# Patient Record
Sex: MALE | Race: WHITE
[De-identification: names, ages, dates, MRNs, and addresses within clinical notes are randomized per-mention and may not be internally consistent; named-entity substitution may affect disease eponyms.]

---

## 2018-06-21 ENCOUNTER — HOSPITAL ENCOUNTER (EMERGENCY)
Dept: HOSPITAL 89 - ER | Age: 38
Discharge: HOME | End: 2018-06-21
Payer: COMMERCIAL

## 2018-06-21 VITALS — DIASTOLIC BLOOD PRESSURE: 70 MMHG | SYSTOLIC BLOOD PRESSURE: 110 MMHG

## 2018-06-21 DIAGNOSIS — S42.135A: ICD-10-CM

## 2018-06-21 DIAGNOSIS — S46.212A: Primary | ICD-10-CM

## 2018-06-21 PROCEDURE — 73060 X-RAY EXAM OF HUMERUS: CPT

## 2018-06-21 PROCEDURE — 99283 EMERGENCY DEPT VISIT LOW MDM: CPT

## 2018-06-21 PROCEDURE — 73070 X-RAY EXAM OF ELBOW: CPT

## 2018-06-21 NOTE — RADIOLOGY IMAGING REPORT
FACILITY: [facility] 

 

PATIENT NAME: Jon Hunter

: 1980

MR: [id number]

V: [id number]

EXAM DATE: 

ORDERING PHYSICIAN: EDITH KENYON

TECHNOLOGIST: 

 

Location: [facility] - [place]

Patient: Jon Hunter

: 1980

MRN: [id number]

Visit/Account:[id number]

Date of Sevice:  2018

 

ACCESSION #: [id number]

 

Exam type: ELBOW 2 VIEW LEFT

 

History: arm pain

 

Comparison: None.

 

Findings:

 

There is a tiny avulsion fracture fragment just anterior to the left elbow joint.  The donor site vannesa
ears to be the coronoid process.  No radiopaque soft tissue foreign body seen.

 

IMPRESSION:

 

1.  There is a tiny avulsion fracture fragment seen just anterior to the left elbow joint.  The donor
 site appears to be the coronoid process

 

Report Dictated By: Dolores Luna MD at 2018 1:46 PM

 

Report E-Signed By: Dolores Luna MD  at 2018 1:50 PM

 

WSN:[id number]

## 2018-06-21 NOTE — RADIOLOGY IMAGING REPORT
[place] [facility] [place] 

 

PATIENT NAME: Jon Hunter

: 1980

MR: [id number]

V: [id number]

EXAM DATE: 

ORDERING PHYSICIAN: EDITH KENYON

TECHNOLOGIST: 

 

Location: [facility]

Patient: Jon Hunter

: 1980

MRN: [id number]

Visit/Account:[id number]

Date of Sevice:  2018

 

ACCESSION #: [id number]

 

Exam type: HUMERUS LEFT

 

History: arm pain

 

Comparison: Left elbow performed today.

 

Findings:

 

Small avulsion fracture fragment of the coronoid process appears to be better depicted on today's lef
t elbow series.  No fracture of the left humerus is appreciated

 

IMPRESSION:

 

1.  Small motion fracture fragment of the coronoid process appears to be better depicted on today's l
eft elbow series

 

Report Dictated By: Dolores Luna MD at 2018 1:50 PM

 

Report E-Signed By: Dolores Luna MD  at 2018 1:52 PM

 

WSN:[email or web address]

## 2018-10-18 ENCOUNTER — HOSPITAL ENCOUNTER (EMERGENCY)
Dept: HOSPITAL 89 - ER | Age: 38
LOS: 1 days | Discharge: HOME | End: 2018-10-19
Payer: SELF-PAY

## 2018-10-18 VITALS — SYSTOLIC BLOOD PRESSURE: 126 MMHG | DIASTOLIC BLOOD PRESSURE: 92 MMHG

## 2018-10-18 DIAGNOSIS — S92.415A: Primary | ICD-10-CM

## 2018-10-18 PROCEDURE — 99283 EMERGENCY DEPT VISIT LOW MDM: CPT

## 2018-10-18 NOTE — ER REPORT
History and Physical


Time Seen By MD:  23:25


HPI/ROS


CHIEF COMPLAINT: Great toe injury





HISTORY OF PRESENT ILLNESS: 38-year-old male presents ambulatory to the ER 


complaining of severe left toe pain, ecchymosis and swelling.  Patient states he


kicked a wall approximately 8 hours ago.  He did not realize the wall behind the


surface.  He was kicking was concrete.  He has throbbing 8/10 pain in his left 


toe.  His other is a swollen and ecchymotic.  Patient is a type II diabetic, 


insulin required.


Allergies:  


Coded Allergies:  


     Penicillins (Unverified  Allergy, Unknown, none listed by patient, 


10/18/18)


     ibuprofen (Unverified  Allergy, Unknown, not listed by patient, 10/18/18)


     lavender (Lavandula angustifolia) (Unverified  Allergy, Unknown, not listed


by patient, 10/18/18)


Home Meds


Active Scripts


Hydrocodone Bit/Acetaminophen (HYDROCODON-ACETAMINOPHEN 5-325) 1 Each Tablet, 1 


EACH PO Q4-6H PRN for PAIN, #20


   TAKE ONE TABLET BY MOUTH


   EVERY 4-6 HOURS AS NEEDED


   FOR PAIN


   Prov:CHUY WATERS DO         10/19/18


Reported Medications


Insulin Lispro 100 Un/Ml Vial (HUMALOG 100 U/ML VIAL) Unknown Strength Vial, SQ 


PRN, VIAL


   6/15/18


Lisinopril (LISINOPRIL) Unknown Strength Tablet, PO QDAY, TAB


   6/15/18


Cholecalciferol (Vitamin D3) (VITAMIN D3) Unknown Strength Tablet, PO QDAY


   6/15/18


Insulin Glargine (LANTUS) 100 Unit/Ml Soln, 40 UNITS SUBQ QDAY, ML


   6/15/18


Past Medical/Surgical History


Diabetes insulin required


Reviewed Nurses Notes:  Yes


Old Medical Records Reviewed:  Yes


Smoking Status:  Current: Every Day Smoker


Hx Substance Use Disorder:  No


Hx Alcohol Use:  Yes (occ)


Constitutional





Vital Sign - Last 24 Hours








 10/18/18 10/18/18 10/18/18 10/18/18





 23:28 23:28 23:37 23:52


 


Temp  98.8  


 


Pulse  89 90 85


 


Resp  18  


 


B/P (MAP) 126/92 (103) 126/92  


 


Pulse Ox  91 89 96


 


O2 Delivery  Room Air  


 


    





 10/19/18   





 00:07   


 


Pulse 82   


 


Pulse Ox 92   








Physical Exam


   General appearance: Alert no distress.


Respiratory: Chest is non tender, lungs are clear to auscultation.


Cardiac: Regular rate and rhythm 


Extremities: Examination of the left foot reveals an ecchymotic great toe.  It 


is swollen moderately.





DIFFERENTIAL DIAGNOSIS: After history and physical exam differential diagnosis 


was considered for sprain, strain, fracture, dislocation, contusion





Medical Decision Making


EKG/Imaging


Imaging


X-ray: The great toe, 3 views was obtained.  I viewed the images myself on the 


PACS system.  My interpretation of the images is: Nondisplaced fracture of the 


small phalanx.  The radiologist interpretation had no clinically significant 


variation from this interpretation.





ED Course/Re-evaluation


ED Course


She was admitted to an examination room.  H&P was done.  The differential 


diagnoses was considered.  On clinical examination.  Patient has an ecchymotic 


toe consistent with a significant injury.  X-rays are performed.  Shows a 


nondisplaced fracture of the proximal phalanx of the left great toe.  She's 


placed in a postoperative shoe for support.  He is advised ice and ibuprofen.  


Prescription of hydrocodone as provided for additional pain relief.  Given a 


note to be off his feet for 4-5 days.


Decision to Disposition Date:  Oct 19, 2018


Decision to Disposition Time:  00:01





Depart


Departure


Latest Vital Signs





Vital Signs








  Date Time  Temp Pulse Resp B/P (MAP) Pulse Ox O2 Delivery O2 Flow Rate FiO2


 


10/19/18 00:07  82   92   


 


10/18/18 23:28 98.8  18 126/92  Room Air  








Impression:  


   Primary Impression:  


   Fracture of great toe, left, closed


Condition:  Improved


Disposition:  HOME OR SELF-CARE


New Scripts


Hydrocodone Bit/Acetaminophen (HYDROCODON-ACETAMINOPHEN 5-325) 1 Each Tablet


1 EACH PO Q4-6H PRN for PAIN, #20


   TAKE ONE TABLET BY MOUTH


   EVERY 4-6 HOURS AS NEEDED


   FOR PAIN


   Prov: CHUY WATERS DO         10/19/18


Patient Instructions:  Toe Fracture (ED)





Additional Instructions:  


Take ibuprofen 200 mg 3 tablets 3 times a day with food


Elevate your toe and apply ice


Follow-up with your primary care early next week





Problem Qualifiers








   Primary Impression:  


   Fracture of great toe, left, closed


   Encounter type:  initial encounter  Phalanx:  proximal  Fracture alignment:  


   nondisplaced  Qualified Codes:  S92.415A - Nondisplaced fracture of proximal 


   phalanx of left great toe, initial encounter for closed fracture








CHUY WATERS DO              Oct 18, 2018 23:26

## 2018-10-19 NOTE — RADIOLOGY IMAGING REPORT
FACILITY: [facility] - [place] 

 

PATIENT NAME: Jon Hunter

: 1980

MR: [id number]

V: [id number]

EXAM DATE: 

ORDERING PHYSICIAN: CHUY WATERS

TECHNOLOGIST: 

 

Location: [facility] - [place]

Patient: Jon Hunter

: 1980

MRN: [id number]

Visit/Account:[id number]

Date of Sevice: 10/18/2018

 

ACCESSION #: [id number]

 

 

Left first toe:

 

Indication: Injury.

Technique: 3 views were obtained.

Comparison: None.

 

Findings: There is a nondisplaced fracture of the proximal phalanx. There is no evidence of dislocati
on. The visualized skeletal structures are otherwise intact. There is normal mineralization. No focal
 soft tissue deformity is evident.

 

IMPRESSION: Nondisplaced fracture of the proximal phalanx.

 

Report Dictated By: West Forrest MD at 10/19/2018 12:39 AM

 

Report E-Signed By: West Forrest MD  at 10/19/2018 12:41 AM

 

WSN:[id number]

## 2019-02-11 ENCOUNTER — HOSPITAL ENCOUNTER (OUTPATIENT)
Dept: HOSPITAL 89 - DIET | Age: 39
LOS: 9 days | Discharge: HOME | End: 2019-02-20
Attending: FAMILY MEDICINE
Payer: SELF-PAY

## 2019-02-11 DIAGNOSIS — Z79.4: ICD-10-CM

## 2019-02-11 DIAGNOSIS — E10.9: Primary | ICD-10-CM

## 2019-02-14 ENCOUNTER — HOSPITAL ENCOUNTER (EMERGENCY)
Dept: HOSPITAL 89 - ER | Age: 39
Discharge: HOME | End: 2019-02-14
Payer: SELF-PAY

## 2019-02-14 VITALS — DIASTOLIC BLOOD PRESSURE: 83 MMHG | SYSTOLIC BLOOD PRESSURE: 117 MMHG

## 2019-02-14 DIAGNOSIS — F17.210: ICD-10-CM

## 2019-02-14 DIAGNOSIS — E11.9: ICD-10-CM

## 2019-02-14 DIAGNOSIS — J06.9: Primary | ICD-10-CM

## 2019-02-14 LAB — PLATELET COUNT, AUTOMATED: 219 K/UL (ref 150–450)

## 2019-02-14 PROCEDURE — 82948 REAGENT STRIP/BLOOD GLUCOSE: CPT

## 2019-02-14 PROCEDURE — 82565 ASSAY OF CREATININE: CPT

## 2019-02-14 PROCEDURE — 99284 EMERGENCY DEPT VISIT MOD MDM: CPT

## 2019-02-14 PROCEDURE — 84460 ALANINE AMINO (ALT) (SGPT): CPT

## 2019-02-14 PROCEDURE — 84520 ASSAY OF UREA NITROGEN: CPT

## 2019-02-14 PROCEDURE — 96372 THER/PROPH/DIAG INJ SC/IM: CPT

## 2019-02-14 PROCEDURE — 82947 ASSAY GLUCOSE BLOOD QUANT: CPT

## 2019-02-14 PROCEDURE — 36416 COLLJ CAPILLARY BLOOD SPEC: CPT

## 2019-02-14 PROCEDURE — 71046 X-RAY EXAM CHEST 2 VIEWS: CPT

## 2019-02-14 PROCEDURE — 82310 ASSAY OF CALCIUM: CPT

## 2019-02-14 PROCEDURE — 87502 INFLUENZA DNA AMP PROBE: CPT

## 2019-02-14 PROCEDURE — 82247 BILIRUBIN TOTAL: CPT

## 2019-02-14 PROCEDURE — 85025 COMPLETE CBC W/AUTO DIFF WBC: CPT

## 2019-02-14 PROCEDURE — 84155 ASSAY OF PROTEIN SERUM: CPT

## 2019-02-14 PROCEDURE — 84295 ASSAY OF SERUM SODIUM: CPT

## 2019-02-14 PROCEDURE — 82435 ASSAY OF BLOOD CHLORIDE: CPT

## 2019-02-14 PROCEDURE — 84075 ASSAY ALKALINE PHOSPHATASE: CPT

## 2019-02-14 PROCEDURE — 84450 TRANSFERASE (AST) (SGOT): CPT

## 2019-02-14 PROCEDURE — 82374 ASSAY BLOOD CARBON DIOXIDE: CPT

## 2019-02-14 PROCEDURE — 84132 ASSAY OF SERUM POTASSIUM: CPT

## 2019-02-14 PROCEDURE — 82040 ASSAY OF SERUM ALBUMIN: CPT

## 2019-02-14 NOTE — ER REPORT
History and Physical


Time Seen By MD:  13:39


HPI/ROS


CHIEF COMPLAINT: Cough, congestion, not feeling well





HISTORY OF PRESENT ILLNESS: 38-year-old male patient presents to emergency room 


with complaint of cough, congestion feeling well. Patient states that he has not


been feeling well since last night. He states he did check his temperature this 


morning when he got up and had a temperature of 96. He states that he has had a


persistent cough throughout the day. He states that his children do go to 


monitor her school where half the school was out because the flu. Patient states


that he was vaccinated for the flu. He denies having any fevers, chills. However


he states he has been having a lot of body aches. He states he is eating and 


drinking okay without any difficulties. He states that he and his wife both 


became ill last night. He states that his daughter is examined by her 


pediatrician 2:00 this afternoon she has had a fever 102.





REVIEW OF SYSTEMS:


Respiratory: As noted above


Cardiovascular: No chest pain, no palpitations.


Gastrointestinal: No vomiting, no abdominal pain.


Musculoskeletal: No back pain.


Allergies:  


Coded Allergies:  


     Penicillins (Unverified  Allergy, Unknown, none listed by patient, 


10/18/18)


     ibuprofen (Unverified  Allergy, Unknown, not listed by patient, 10/18/18)


     lavender (Lavandula angustifolia) (Unverified  Allergy, Unknown, not listed


by patient, 10/18/18)


Home Meds


Active Scripts


Oseltamivir Phosphate (TAMIFLU) 75 Mg Cap, 75 MG PO BID, #10 CAP


   Prov:EDITH KENYON FN         2/14/19


Reported Medications


Insulin Lispro 100 Un/Ml Vial (HUMALOG 100 U/ML VIAL) Unknown Strength Vial, SQ 


PRN, VIAL


   6/15/18


Lisinopril (LISINOPRIL) Unknown Strength Tablet, PO QDAY, TAB


   6/15/18


Cholecalciferol (Vitamin D3) (VITAMIN D3) Unknown Strength Tablet, PO QDAY


   6/15/18


Insulin Glargine (LANTUS) 100 Unit/Ml Soln, 40 UNITS SUBQ QDAY, ML


   6/15/18


Discontinued Scripts


Hydrocodone Bit/Acetaminophen (HYDROCODON-ACETAMINOPHEN 5-325) 1 Each Tablet, 1 


EACH PO Q4-6H PRN for PAIN, #20


   TAKE ONE TABLET BY MOUTH


   EVERY 4-6 HOURS AS NEEDED


   FOR PAIN


   Prov:CHUY WATERS DO         10/19/18


Past Medical/Surgical History


Patient has a past medical history of torn left biceps, arthritis, diabetes, 


alcohol use.


Patient surgical history multiple removed, cholecystectomy, right leg surgery.


Reviewed Nurses Notes:  Yes


Smoking Status:  Current: Every Day Smoker


Hx Substance Use Disorder:  No


Hx Alcohol Use:  Yes (occ)


Constitutional





Vital Sign - Last 24 Hours








 2/14/19 2/14/19 2/14/19 2/14/19





 13:37 13:37 13:45 14:00


 


Temp  98.9  


 


Pulse  83 93 75


 


Resp  16  


 


B/P (MAP) 129/83 (98) 129/83  110/64 (79)


 


Pulse Ox  90 94 88


 


O2 Delivery  Room Air  


 


    





 2/14/19 2/14/19 2/14/19 2/14/19





 14:15 14:30 14:45 15:00


 


Pulse 85 86 81 ???


 


B/P (MAP)  120/84 (96)  117/83 (94)


 


Pulse Ox 83 88 87 89








Physical Exam


  General Appearance: The patient is alert, has no immediate need for airway 


protection and no current signs of toxicity.


Respiratory: Chest is non tender, lungs are clear to auscultation.


Cardiac: regular rate and rhythm


Gastrointestinal: Abdomen is soft and non tender, no masses, bowel sounds 


normal.


Musculoskeletal:  Neck: Neck is supple and non tender.


   Extremities have full range of motion and are non tender.


Skin: No rashes or lesions.





DIFFERENTIAL DIAGNOSIS: After history and physical exam differential diagnosis 


was considered for upper extremity infection, influenza, pneumonia.





Medical Decision Making


Data Points


Result Diagram:  


2/14/19 1340                                                                    


           2/14/19 1340





Laboratory





Hematology








Test


 2/14/19


13:40 2/14/19


13:45 2/14/19


15:13


 


Red Blood Count


 5.04 M/uL


(4.00-5.60) 


 





 


Mean Corpuscular Volume


 88.5 fL


(80.0-96.0) 


 





 


Mean Corpuscular Hemoglobin


 29.7 pg


(26.0-33.0) 


 





 


Mean Corpuscular Hemoglobin


Concent 33.5 g/dL


(32.0-36.0) 


 





 


Red Cell Distribution Width


 13.7 %


(11.5-14.5) 


 





 


Mean Platelet Volume


 8.8 fL


(7.2-11.1) 


 





 


Neutrophils (%) (Auto)


 67.6 %


(39.4-72.5) 


 





 


Lymphocytes (%) (Auto)


 18.8 %


(17.6-49.6) 


 





 


Monocytes (%) (Auto)


 12.3 %


(4.1-12.4) 


 





 


Eosinophils (%) (Auto)


 0.5 %


(0.4-6.7) 


 





 


Basophils (%) (Auto)


 0.8 %


(0.3-1.4) 


 





 


Nucleated RBC Relative Count


(auto) 0.0 /100WBC 


 


 





 


Neutrophils # (Auto)


 3.6 K/uL


(2.0-7.4) 


 





 


Lymphocytes # (Auto)


 1.0 K/uL


(1.3-3.6) 


 





 


Monocytes # (Auto)


 0.7 K/uL


(0.3-1.0) 


 





 


Eosinophils # (Auto)


 0.0 K/uL


(0.0-0.5) 


 





 


Basophils # (Auto)


 0.0 K/uL


(0.0-0.1) 


 





 


Nucleated RBC Absolute Count


(auto) 0.00 K/uL 


 


 





 


Sodium Level


 135 mmol/L


(137-145) 


 





 


Potassium Level


 4.3 mmol/L


(3.5-5.0) 


 





 


Chloride Level


 100 mmol/L


() 


 





 


Carbon Dioxide Level


 27 mmol/L


(22-30) 


 





 


Blood Urea Nitrogen


 21 mg/dl


(9-21) 


 





 


Creatinine


 0.80 mg/dl


(0.66-1.25) 


 





 


Glomerular Filtration Rate


Calc > 60.0 


 


 





 


Random Glucose


 329 mg/dl


() 


 





 


Calcium Level


 8.9 mg/dl


(8.4-10.2) 


 





 


Total Bilirubin


 0.8 mg/dl


(0.2-1.3) 


 





 


Aspartate Amino Transf


(AST/SGOT) 58 U/L (0-35) 


 


 





 


Alanine Aminotransferase


(ALT/SGPT) 79 U/L (0-56) 


 


 





 


Alkaline Phosphatase


 104 U/L


(0-126) 


 





 


Total Protein


 7.5 g/dl


(6.3-8.2) 


 





 


Albumin


 4.4 g/dl


(3.5-5.0) 


 





 


Influenza Virus Type A (PCR)


 


 Negative


(NEGATIVE) 





 


Influenza Virus Type B (PCR)


 


 Negative


(NEGATIVE) 





 


Whole Blood Glucose


 


 


 222 mg/DL


()








Chemistry








Test


 2/14/19


13:40 2/14/19


13:45 2/14/19


15:13


 


White Blood Count


 5.3 k/uL


(4.5-11.0) 


 





 


Red Blood Count


 5.04 M/uL


(4.00-5.60) 


 





 


Hemoglobin


 14.9 g/dL


(14.0-18.0) 


 





 


Hematocrit


 44.6 %


(42.0-52.0) 


 





 


Mean Corpuscular Volume


 88.5 fL


(80.0-96.0) 


 





 


Mean Corpuscular Hemoglobin


 29.7 pg


(26.0-33.0) 


 





 


Mean Corpuscular Hemoglobin


Concent 33.5 g/dL


(32.0-36.0) 


 





 


Red Cell Distribution Width


 13.7 %


(11.5-14.5) 


 





 


Platelet Count


 219 K/uL


(150-450) 


 





 


Mean Platelet Volume


 8.8 fL


(7.2-11.1) 


 





 


Neutrophils (%) (Auto)


 67.6 %


(39.4-72.5) 


 





 


Lymphocytes (%) (Auto)


 18.8 %


(17.6-49.6) 


 





 


Monocytes (%) (Auto)


 12.3 %


(4.1-12.4) 


 





 


Eosinophils (%) (Auto)


 0.5 %


(0.4-6.7) 


 





 


Basophils (%) (Auto)


 0.8 %


(0.3-1.4) 


 





 


Nucleated RBC Relative Count


(auto) 0.0 /100WBC 


 


 





 


Neutrophils # (Auto)


 3.6 K/uL


(2.0-7.4) 


 





 


Lymphocytes # (Auto)


 1.0 K/uL


(1.3-3.6) 


 





 


Monocytes # (Auto)


 0.7 K/uL


(0.3-1.0) 


 





 


Eosinophils # (Auto)


 0.0 K/uL


(0.0-0.5) 


 





 


Basophils # (Auto)


 0.0 K/uL


(0.0-0.1) 


 





 


Nucleated RBC Absolute Count


(auto) 0.00 K/uL 


 


 





 


Glomerular Filtration Rate


Calc > 60.0 


 


 





 


Calcium Level


 8.9 mg/dl


(8.4-10.2) 


 





 


Total Bilirubin


 0.8 mg/dl


(0.2-1.3) 


 





 


Aspartate Amino Transf


(AST/SGOT) 58 U/L (0-35) 


 


 





 


Alanine Aminotransferase


(ALT/SGPT) 79 U/L (0-56) 


 


 





 


Alkaline Phosphatase


 104 U/L


(0-126) 


 





 


Total Protein


 7.5 g/dl


(6.3-8.2) 


 





 


Albumin


 4.4 g/dl


(3.5-5.0) 


 





 


Influenza Virus Type A (PCR)


 


 Negative


(NEGATIVE) 





 


Influenza Virus Type B (PCR)


 


 Negative


(NEGATIVE) 





 


Whole Blood Glucose


 


 


 222 mg/DL


()











EKG/Imaging


Imaging


Technique: CHEST PA LAT


 


HISTORY: cough


 


COMPARISON: None available


 


Findings: The lungs are clear.  No pleural effusion or pneumothorax.  The 


cardiomediastinal silhouette is normal.


 


Impression:


1.  No acute cardiopulmonary process.


 


Report Dictated By: Francisco Javier Warren DO at 2/14/2019 2:19 PM


 


Report E-Signed By: Francisco Javier Warren DO  at 2/14/2019 2:20 PM





ED Course/Re-evaluation


ED Course


Patient was admitted to an exam room, history and physical obtained. 


Differential diagnoses were considered. On examination lungs are clear, heart is


regular, abdomen is soft and nontender. Patient had a heart rate of 84. The 


history sounded like he had influenza screen was done, a CBC, CMP were drawn. 


Chest x-ray was also done. The results were unremarkable, making me think this 


is likely a viral infection. Patient did have elevated blood sugar 300. He is a 


type I diabetic. Patient states he would normally take 4 units of Humalog. That 


was done repeat blood sugar should limit his blood sugar given 222. Influenza 


screen was. I discussed the findings with the patient. I believe the patient carlyle arana has a viral illness. However his daughter was evaluated by her pediatrician


while he was here and was positive for reflux. We will go ahead and treat him 


prophylactically with Tamiflu. I discussed with patient who verbalized 


understanding and agreement with plan. Patient was also given a prescription for


his wife for Tamiflu as well.


Decision to Disposition Date:  Feb 14, 2019


Decision to Disposition Time:  15:05





Depart


Departure


Latest Vital Signs





Vital Signs








  Date Time  Temp Pulse Resp B/P (MAP) Pulse Ox O2 Delivery O2 Flow Rate FiO2


 


2/14/19 15:00  ???  117/83 (94) 89   


 


2/14/19 13:37 98.9  16   Room Air  








Impression:  


   Primary Impression:  


   Upper respiratory infection


   Additional Impression:  


   Exposure to influenza


Condition:  Stable


Disposition:  HOME OR SELF-CARE


New Scripts


Oseltamivir Phosphate (TAMIFLU) 75 Mg Cap


75 MG PO BID, #10 CAP


   Prov: EDITH KENYON         2/14/19


Patient Instructions:  Upper Respiratory Infection (ED)





Additional Instructions:  


Increase fluid intake.


Get plenty of rest.


Take Tylenol or Ibuprofen as needed for fevers.


Return to the ER if condition worsens.


Follow up with your primary care provider in the next week with any concerns.





Problem Qualifiers








   Primary Impression:  


   Upper respiratory infection


   URI type:  unspecified viral URI  Qualified Codes:  J06.9 - Acute upper 


   respiratory infection, unspecified








EDITH KENYON                Feb 14, 2019 13:39

## 2019-02-14 NOTE — RADIOLOGY IMAGING REPORT
FACILITY: [facility] 

 

PATIENT NAME: Jon Hunter

: 1980

MR: [id number]

V: [id number]

EXAM DATE: 

ORDERING PHYSICIAN: EDITH KENYON

TECHNOLOGIST: 

 

Location: [facility] - [place]

Patient: Jon Hunter

: 1980

MRN: [id number]

Visit/Account:[id number]

Date of Sevice:  2019

 

ACCESSION #: [id number]

 

Technique: CHEST PA LAT

 

HISTORY: cough

 

COMPARISON: None available

 

Findings: The lungs are clear.  No pleural effusion or pneumothorax.  The cardiomediastinal silhouett
e is normal.

 

Impression:

1.  No acute cardiopulmonary process.

 

Report Dictated By: Francisco Javier Warren DO at 2019 2:19 PM

 

Report E-Signed By: Francisco Javier Warren DO  at 2019 2:20 PM

 

WSN:[id number]

## 2019-04-18 ENCOUNTER — HOSPITAL ENCOUNTER (EMERGENCY)
Dept: HOSPITAL 89 - ER | Age: 39
Discharge: HOME | End: 2019-04-18
Payer: SELF-PAY

## 2019-04-18 VITALS — DIASTOLIC BLOOD PRESSURE: 70 MMHG | SYSTOLIC BLOOD PRESSURE: 112 MMHG

## 2019-04-18 DIAGNOSIS — R79.89: ICD-10-CM

## 2019-04-18 DIAGNOSIS — R10.32: Primary | ICD-10-CM

## 2019-04-18 LAB — PLATELET COUNT, AUTOMATED: 240 K/UL (ref 150–450)

## 2019-04-18 PROCEDURE — 86677 HELICOBACTER PYLORI ANTIBODY: CPT

## 2019-04-18 PROCEDURE — 71046 X-RAY EXAM CHEST 2 VIEWS: CPT

## 2019-04-18 PROCEDURE — 82565 ASSAY OF CREATININE: CPT

## 2019-04-18 PROCEDURE — 71100 X-RAY EXAM RIBS UNI 2 VIEWS: CPT

## 2019-04-18 PROCEDURE — 82247 BILIRUBIN TOTAL: CPT

## 2019-04-18 PROCEDURE — 84132 ASSAY OF SERUM POTASSIUM: CPT

## 2019-04-18 PROCEDURE — 36416 COLLJ CAPILLARY BLOOD SPEC: CPT

## 2019-04-18 PROCEDURE — 84520 ASSAY OF UREA NITROGEN: CPT

## 2019-04-18 PROCEDURE — 74177 CT ABD & PELVIS W/CONTRAST: CPT

## 2019-04-18 PROCEDURE — 82948 REAGENT STRIP/BLOOD GLUCOSE: CPT

## 2019-04-18 PROCEDURE — 99284 EMERGENCY DEPT VISIT MOD MDM: CPT

## 2019-04-18 PROCEDURE — 82374 ASSAY BLOOD CARBON DIOXIDE: CPT

## 2019-04-18 PROCEDURE — 96361 HYDRATE IV INFUSION ADD-ON: CPT

## 2019-04-18 PROCEDURE — 84295 ASSAY OF SERUM SODIUM: CPT

## 2019-04-18 PROCEDURE — 83690 ASSAY OF LIPASE: CPT

## 2019-04-18 PROCEDURE — 82040 ASSAY OF SERUM ALBUMIN: CPT

## 2019-04-18 PROCEDURE — 84450 TRANSFERASE (AST) (SGOT): CPT

## 2019-04-18 PROCEDURE — 84460 ALANINE AMINO (ALT) (SGPT): CPT

## 2019-04-18 PROCEDURE — 96374 THER/PROPH/DIAG INJ IV PUSH: CPT

## 2019-04-18 PROCEDURE — 82435 ASSAY OF BLOOD CHLORIDE: CPT

## 2019-04-18 PROCEDURE — 82310 ASSAY OF CALCIUM: CPT

## 2019-04-18 PROCEDURE — 96375 TX/PRO/DX INJ NEW DRUG ADDON: CPT

## 2019-04-18 PROCEDURE — 80074 ACUTE HEPATITIS PANEL: CPT

## 2019-04-18 PROCEDURE — 82947 ASSAY GLUCOSE BLOOD QUANT: CPT

## 2019-04-18 PROCEDURE — 85025 COMPLETE CBC W/AUTO DIFF WBC: CPT

## 2019-04-18 PROCEDURE — 81001 URINALYSIS AUTO W/SCOPE: CPT

## 2019-04-18 PROCEDURE — 84075 ASSAY ALKALINE PHOSPHATASE: CPT

## 2019-04-18 PROCEDURE — 84155 ASSAY OF PROTEIN SERUM: CPT
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
## 2019-04-18 NOTE — RADIOLOGY IMAGING REPORT
FACILITY: [facility] - [place] 

 

PATIENT NAME: Jon Hunter

: 1980

MR: [id number]

V: [id number]

EXAM DATE: 

ORDERING PHYSICIAN: MAGDA PARNELL

TECHNOLOGIST: 

 

Location: [facility] - [place]

Patient: Jon Hunter

: 1980

MRN: [id number]

Visit/Account:[id number]

Date of Sevice:  2019

 

ACCESSION #: [id number]

 

EXAMINATION: CT abdomen and pelvis with IV contrast

 

HISTORY:  Abdominal pain.

 

TECHNIQUE:   Axial CT images of the abdomen and pelvis were obtained with IV contrast, with coronal a
nd sagittal 2D reconstructed images.

One of the following dose optimization techniques was utilized in the performance of this exam: Autom
ated exposure control; adjustment of the mA and/or kV according to the patient's size; or use of an i
terative  reconstruction technique.  Specific details can be referenced in the facility's radiology C
T exam operational policy.

 

Contrast:  75 mL of IV Isovue-370.

 

COMPARISON:  None.

 

FINDINGS:

Liver:  Negative.

 

Gallbladder and bile ducts:  Cholecystectomy. Mild prominence of the central bile ducts may relate to
 the postcholecystectomy state.

 

Spleen:  Negative.

 

Pancreas:  Negative.

 

Adrenal glands:  Negative.

 

Kidneys:  Single nonobstructing 2 mm calculus in the lower pole of the right kidney. No left-sided ur
inary calculi. No hydronephrosis. The kidneys enhance normally. There is a 1.5 cm cyst in the mid lef
t kidney.

 

Bowel and peritoneum:  The small bowel and colon are normal in caliber, without evidence of obstructi
on or any focal inflammatory process. There are a few colonic diverticula along the sigmoid colon wit
hout evidence of diverticulitis. The segmentally visualized appendix is unremarkable. No free fluid o
r free intraperitoneal air.

 

Pelvic  structures:  Negative.

 

Lymph node assessment:  Negative.

 

Vessels:  Negative.

 

Musculoskeletal:   Negative.

 

Body wall:  Negative.

 

Lung bases:  Negative.

 

IMPRESSION:

1. No CT evidence of acute intra-abdominal pathology. No specific source of abdominal pain is identif
ied.

2. Cholecystectomy.

3. Single nonobstructing 2 mm right renal calculus. No left-sided urinary calculi.

4. Colonic diverticulosis along the sigmoid colon without evidence of diverticulitis.

 

 

Report Dictated By: Prabhjot Barnett MD at 2019 2:44 PM

 

Report E-Signed By: Prabhjot Barnett MD  at 2019 2:59 PM

 

WSN:[id number]
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
